# Patient Record
Sex: FEMALE | Race: OTHER | HISPANIC OR LATINO | URBAN - METROPOLITAN AREA
[De-identification: names, ages, dates, MRNs, and addresses within clinical notes are randomized per-mention and may not be internally consistent; named-entity substitution may affect disease eponyms.]

---

## 2024-06-25 ENCOUNTER — APPOINTMENT (OUTPATIENT)
Dept: CARDIOLOGY | Facility: HOSPITAL | Age: 59
End: 2024-06-25
Payer: MEDICARE

## 2024-06-25 ENCOUNTER — HOSPITAL ENCOUNTER (EMERGENCY)
Facility: HOSPITAL | Age: 59
Discharge: HOME | End: 2024-06-25
Attending: EMERGENCY MEDICINE
Payer: MEDICARE

## 2024-06-25 VITALS
DIASTOLIC BLOOD PRESSURE: 84 MMHG | HEART RATE: 68 BPM | SYSTOLIC BLOOD PRESSURE: 153 MMHG | RESPIRATION RATE: 20 BRPM | BODY MASS INDEX: 38.98 KG/M2 | TEMPERATURE: 97.9 F | OXYGEN SATURATION: 95 % | WEIGHT: 220 LBS | HEIGHT: 63 IN

## 2024-06-25 DIAGNOSIS — R73.9 HYPERGLYCEMIA: Primary | ICD-10-CM

## 2024-06-25 DIAGNOSIS — Z91.199 PATIENT'S NONCOMPLIANCE WITH OTHER MEDICAL TREATMENT AND REGIMEN DUE TO UNSPECIFIED REASON: ICD-10-CM

## 2024-06-25 LAB
ALBUMIN SERPL BCP-MCNC: 4.1 G/DL (ref 3.4–5)
ALP SERPL-CCNC: 78 U/L (ref 33–136)
ALT SERPL W P-5'-P-CCNC: 27 U/L (ref 7–45)
ANION GAP BLDV CALCULATED.4IONS-SCNC: 17 MMOL/L (ref 10–25)
ANION GAP SERPL CALC-SCNC: 15 MMOL/L (ref 10–20)
APPEARANCE UR: CLEAR
AST SERPL W P-5'-P-CCNC: 21 U/L (ref 9–39)
B-OH-BUTYR SERPL-SCNC: 0.15 MMOL/L (ref 0.02–0.27)
BASE EXCESS BLDV CALC-SCNC: 0.2 MMOL/L (ref -2–3)
BASOPHILS # BLD AUTO: 0.05 X10*3/UL (ref 0–0.1)
BASOPHILS NFR BLD AUTO: 0.5 %
BILIRUB SERPL-MCNC: 0.5 MG/DL (ref 0–1.2)
BILIRUB UR STRIP.AUTO-MCNC: NEGATIVE MG/DL
BODY TEMPERATURE: 37 DEGREES CELSIUS
BUN SERPL-MCNC: 12 MG/DL (ref 6–23)
CA-I BLDV-SCNC: 1.23 MMOL/L (ref 1.1–1.33)
CALCIUM SERPL-MCNC: 9.6 MG/DL (ref 8.6–10.3)
CARDIAC TROPONIN I PNL SERPL HS: 4 NG/L (ref 0–13)
CARDIAC TROPONIN I PNL SERPL HS: 4 NG/L (ref 0–13)
CHLORIDE BLDV-SCNC: 92 MMOL/L (ref 98–107)
CHLORIDE SERPL-SCNC: 94 MMOL/L (ref 98–107)
CO2 SERPL-SCNC: 26 MMOL/L (ref 21–32)
COLOR UR: COLORLESS
CREAT SERPL-MCNC: 0.9 MG/DL (ref 0.5–1.05)
CRITICAL CALL TIME: 1800
CRITICAL CALLED BY: ABNORMAL
CRITICAL CALLED TO: ABNORMAL
CRITICAL READ BACK: ABNORMAL
EGFRCR SERPLBLD CKD-EPI 2021: 73 ML/MIN/1.73M*2
EOSINOPHIL # BLD AUTO: 0.21 X10*3/UL (ref 0–0.7)
EOSINOPHIL NFR BLD AUTO: 2 %
ERYTHROCYTE [DISTWIDTH] IN BLOOD BY AUTOMATED COUNT: 12.2 % (ref 11.5–14.5)
GLUCOSE BLD MANUAL STRIP-MCNC: 363 MG/DL (ref 74–99)
GLUCOSE BLD MANUAL STRIP-MCNC: 489 MG/DL (ref 74–99)
GLUCOSE BLD MANUAL STRIP-MCNC: 558 MG/DL (ref 74–99)
GLUCOSE BLDV-MCNC: ABNORMAL MG/DL
GLUCOSE SERPL-MCNC: 637 MG/DL (ref 74–99)
GLUCOSE UR STRIP.AUTO-MCNC: ABNORMAL MG/DL
HCO3 BLDV-SCNC: 25.4 MMOL/L (ref 22–26)
HCT VFR BLD AUTO: 43.6 % (ref 36–46)
HCT VFR BLD EST: 45 % (ref 36–46)
HGB BLD-MCNC: 15 G/DL (ref 12–16)
HGB BLDV-MCNC: 15 G/DL (ref 12–16)
IMM GRANULOCYTES # BLD AUTO: 0.06 X10*3/UL (ref 0–0.7)
IMM GRANULOCYTES NFR BLD AUTO: 0.6 % (ref 0–0.9)
INHALED O2 CONCENTRATION: 21 %
KETONES UR STRIP.AUTO-MCNC: NEGATIVE MG/DL
LACTATE BLDV-SCNC: 2.7 MMOL/L (ref 0.4–2)
LACTATE SERPL-SCNC: 1.8 MMOL/L (ref 0.4–2)
LACTATE SERPL-SCNC: 2.7 MMOL/L (ref 0.4–2)
LEUKOCYTE ESTERASE UR QL STRIP.AUTO: NEGATIVE
LIPASE SERPL-CCNC: 51 U/L (ref 9–82)
LYMPHOCYTES # BLD AUTO: 4.02 X10*3/UL (ref 1.2–4.8)
LYMPHOCYTES NFR BLD AUTO: 38 %
MAGNESIUM SERPL-MCNC: 1.87 MG/DL (ref 1.6–2.4)
MCH RBC QN AUTO: 29.9 PG (ref 26–34)
MCHC RBC AUTO-ENTMCNC: 34.4 G/DL (ref 32–36)
MCV RBC AUTO: 87 FL (ref 80–100)
MONOCYTES # BLD AUTO: 0.8 X10*3/UL (ref 0.1–1)
MONOCYTES NFR BLD AUTO: 7.6 %
NEUTROPHILS # BLD AUTO: 5.45 X10*3/UL (ref 1.2–7.7)
NEUTROPHILS NFR BLD AUTO: 51.3 %
NITRITE UR QL STRIP.AUTO: NEGATIVE
NRBC BLD-RTO: 0 /100 WBCS (ref 0–0)
OXYHGB MFR BLDV: 70.8 % (ref 45–75)
PCO2 BLDV: 42 MM HG (ref 41–51)
PH BLDV: 7.39 PH (ref 7.33–7.43)
PH UR STRIP.AUTO: 6.5 [PH]
PLATELET # BLD AUTO: 157 X10*3/UL (ref 150–450)
PO2 BLDV: 42 MM HG (ref 35–45)
POTASSIUM BLDV-SCNC: 4.2 MMOL/L (ref 3.5–5.3)
POTASSIUM SERPL-SCNC: 4.2 MMOL/L (ref 3.5–5.3)
PROT SERPL-MCNC: 7.5 G/DL (ref 6.4–8.2)
PROT UR STRIP.AUTO-MCNC: NEGATIVE MG/DL
RBC # BLD AUTO: 5.01 X10*6/UL (ref 4–5.2)
RBC # UR STRIP.AUTO: NEGATIVE /UL
SAO2 % BLDV: 72 % (ref 45–75)
SODIUM BLDV-SCNC: 130 MMOL/L (ref 136–145)
SODIUM SERPL-SCNC: 131 MMOL/L (ref 136–145)
SP GR UR STRIP.AUTO: 1.02
TEST COMMENT: ABNORMAL
UROBILINOGEN UR STRIP.AUTO-MCNC: NORMAL MG/DL
WBC # BLD AUTO: 10.6 X10*3/UL (ref 4.4–11.3)

## 2024-06-25 PROCEDURE — 84484 ASSAY OF TROPONIN QUANT: CPT | Mod: 91 | Performed by: EMERGENCY MEDICINE

## 2024-06-25 PROCEDURE — 82947 ASSAY GLUCOSE BLOOD QUANT: CPT | Mod: 91

## 2024-06-25 PROCEDURE — 93005 ELECTROCARDIOGRAM TRACING: CPT

## 2024-06-25 PROCEDURE — 82435 ASSAY OF BLOOD CHLORIDE: CPT | Performed by: EMERGENCY MEDICINE

## 2024-06-25 PROCEDURE — 85025 COMPLETE CBC W/AUTO DIFF WBC: CPT | Performed by: EMERGENCY MEDICINE

## 2024-06-25 PROCEDURE — 82947 ASSAY GLUCOSE BLOOD QUANT: CPT

## 2024-06-25 PROCEDURE — 81003 URINALYSIS AUTO W/O SCOPE: CPT | Performed by: EMERGENCY MEDICINE

## 2024-06-25 PROCEDURE — 36415 COLL VENOUS BLD VENIPUNCTURE: CPT | Mod: 91 | Performed by: EMERGENCY MEDICINE

## 2024-06-25 PROCEDURE — 84484 ASSAY OF TROPONIN QUANT: CPT | Performed by: EMERGENCY MEDICINE

## 2024-06-25 PROCEDURE — 83605 ASSAY OF LACTIC ACID: CPT | Mod: 91 | Performed by: EMERGENCY MEDICINE

## 2024-06-25 PROCEDURE — 82810 BLOOD GASES O2 SAT ONLY: CPT | Mod: CCI | Performed by: EMERGENCY MEDICINE

## 2024-06-25 PROCEDURE — 83690 ASSAY OF LIPASE: CPT | Performed by: EMERGENCY MEDICINE

## 2024-06-25 PROCEDURE — 99284 EMERGENCY DEPT VISIT MOD MDM: CPT

## 2024-06-25 PROCEDURE — 2500000004 HC RX 250 GENERAL PHARMACY W/ HCPCS (ALT 636 FOR OP/ED): Performed by: EMERGENCY MEDICINE

## 2024-06-25 PROCEDURE — 82010 KETONE BODYS QUAN: CPT | Performed by: EMERGENCY MEDICINE

## 2024-06-25 PROCEDURE — 96374 THER/PROPH/DIAG INJ IV PUSH: CPT

## 2024-06-25 PROCEDURE — 83735 ASSAY OF MAGNESIUM: CPT | Performed by: EMERGENCY MEDICINE

## 2024-06-25 PROCEDURE — 84132 ASSAY OF SERUM POTASSIUM: CPT | Performed by: EMERGENCY MEDICINE

## 2024-06-25 PROCEDURE — 84132 ASSAY OF SERUM POTASSIUM: CPT | Mod: 91 | Performed by: EMERGENCY MEDICINE

## 2024-06-25 PROCEDURE — 2500000002 HC RX 250 W HCPCS SELF ADMINISTERED DRUGS (ALT 637 FOR MEDICARE OP, ALT 636 FOR OP/ED): Performed by: EMERGENCY MEDICINE

## 2024-06-25 PROCEDURE — 96361 HYDRATE IV INFUSION ADD-ON: CPT

## 2024-06-25 RX ORDER — INSULIN LISPRO 100 [IU]/ML
18 INJECTION, SOLUTION INTRAVENOUS; SUBCUTANEOUS ONCE
Status: COMPLETED | OUTPATIENT
Start: 2024-06-25 | End: 2024-06-25

## 2024-06-25 RX ORDER — ONDANSETRON HYDROCHLORIDE 2 MG/ML
4 INJECTION, SOLUTION INTRAVENOUS ONCE
Status: COMPLETED | OUTPATIENT
Start: 2024-06-25 | End: 2024-06-25

## 2024-06-25 ASSESSMENT — PAIN DESCRIPTION - DESCRIPTORS: DESCRIPTORS: DISCOMFORT;ACHING

## 2024-06-25 ASSESSMENT — PAIN - FUNCTIONAL ASSESSMENT: PAIN_FUNCTIONAL_ASSESSMENT: 0-10

## 2024-06-25 ASSESSMENT — LIFESTYLE VARIABLES
EVER FELT BAD OR GUILTY ABOUT YOUR DRINKING: NO
EVER HAD A DRINK FIRST THING IN THE MORNING TO STEADY YOUR NERVES TO GET RID OF A HANGOVER: NO
HAVE PEOPLE ANNOYED YOU BY CRITICIZING YOUR DRINKING: NO
TOTAL SCORE: 0
HAVE YOU EVER FELT YOU SHOULD CUT DOWN ON YOUR DRINKING: NO

## 2024-06-25 ASSESSMENT — PAIN DESCRIPTION - PAIN TYPE: TYPE: CHRONIC PAIN

## 2024-06-25 ASSESSMENT — PAIN SCALES - GENERAL: PAINLEVEL_OUTOF10: 7

## 2024-06-25 NOTE — ED PROVIDER NOTES
HPI   Chief Complaint   Patient presents with    Hyperglycemia     Pt BIBA from home due to high gluc readings x1 week. Pt states she has blurred vision, increase urinary frequency, midsternal chest discomfort, increased sob with exertion. Pt states she is a diabetic, unsure if she is type 1 or 2. Pt states 7/10 chronic pain- attends pain management       Patient is a 59-year-old female, medical history significant for insulin-dependent diabetes, hypertension, sleep apnea who presents to the emergency department feeling generally unwell.  She states that she has had difficulty with her home insulin regimen.  She has an arm monitor that would not stay on.  She states she has not been checking her sugar regularly.  Today, she took her blood sugar and it was almost 600.  She states she has had increased thirst, increased urination, some chest tightness, generalized malaise.  She denies fever.  She denies chills or sweats.  She has not had any vomiting but has felt nauseated.                          No data recorded                     Patient History   No past medical history on file.  No past surgical history on file.  No family history on file.  Social History     Tobacco Use    Smoking status: Not on file    Smokeless tobacco: Not on file   Substance Use Topics    Alcohol use: Not on file    Drug use: Not on file       Physical Exam   ED Triage Vitals   Temp Pulse Resp BP   -- -- -- --      SpO2 Temp src Heart Rate Source Patient Position   -- -- -- --      BP Location FiO2 (%)     -- --       Physical Exam  Vitals and nursing note reviewed.   Constitutional:       General: She is not in acute distress.     Appearance: She is well-developed.   HENT:      Head: Normocephalic and atraumatic.   Eyes:      Extraocular Movements: Extraocular movements intact.      Conjunctiva/sclera: Conjunctivae normal.      Pupils: Pupils are equal, round, and reactive to light.   Cardiovascular:      Rate and Rhythm: Normal rate and  regular rhythm.      Heart sounds: No murmur heard.  Pulmonary:      Effort: Pulmonary effort is normal. No respiratory distress.      Breath sounds: Normal breath sounds.   Abdominal:      Palpations: Abdomen is soft.      Tenderness: There is no abdominal tenderness.   Musculoskeletal:         General: No swelling.      Cervical back: Neck supple.   Skin:     General: Skin is warm and dry.      Capillary Refill: Capillary refill takes less than 2 seconds.   Neurological:      General: No focal deficit present.      Mental Status: She is alert and oriented to person, place, and time.   Psychiatric:         Mood and Affect: Mood normal.         ED Course & [facility]   ED Course as of 06/27/24 0635   Tue Jun 25, 2024   1801 CBC unremarkable. [MK]   1801 Blood gas shows no evidence of acidosis. [MK]   1829 Chemistry panel does demonstrate markedly elevated serum glucose of 637.  Normal bicarb.  No anion gap. [MK]   1829 Urine shows no serum ketones. [MK]   1850 Cardiac enzymes normal. [MK]   1945 Lactic acid normal.  Repeat troponin normal. [MK]      ED Course User Index  [MK] Barak Jensen MD         Diagnoses as of 06/27/24 0635   Hyperglycemia   Patient's noncompliance with other medical treatment and regimen due to unspecified reason       Medical Decision Making  Medical Decision Making: Patient presents emergency department with increasing thirst, urinary frequency, dizziness, generalized malaise.  She is an insulin-dependent diabetic and her meter has not been accurately working.  Today was the first time that she checked her sugar in a while.  She states has been compliant with her insulin, but does not been using her sliding scale because she has not been checking her sugar.  Broad metabolic workup was pursued.  Patient's gas does not show any acidosis.  Serum ketones were negative.  She is hyperglycemic.  Patient was given fluids and insulin.  Blood sugar is slowly trending down.  She does have improvement of  symptoms.  Plan will be to continue to hydrate the patient and bring sugar down.  I do feel as long as she is symptomatically improved, she can safely follow-up with her endocrinologist.    EKG interpreted by myself (ED attending physician): Sinus rhythm.  Rate of 83.  Normal axis and intervals.  No acute ischemia.  No STEMI.    Differential Diagnoses Considered: Hyperglycemia, HHS, DKA, UTI    Chronic Medical Conditions Significantly Affecting Care: Insulin-dependent diabetes    External Records Reviewed: I reviewed recent and relevant outside records including: Medication reconciliation    Independent Interpretation of Studies:  I independently interpreted: No image    Escalation of Care:  Appropriate for likely outpatient management if symptoms improved after blood sugar control    Social Determinants of Health Significantly Affecting Care:  No known social determinant    Prescription Drug Consideration:'s insulin therapy    Diagnostic testing considered: Noncontributory    Discussion of Management with Other Providers:   I discussed the patient/results with: [admitting team, consultant, radiologist, social work, EPAT, case management, PT/OT, RT, PCP, etc.]          Procedure  Procedures     Barak Jensen MD  06/27/24 3579

## 2024-06-26 LAB
ATRIAL RATE: 84 BPM
HOLD SPECIMEN: NORMAL
P AXIS: 67 DEGREES
PR INTERVAL: 164 MS
Q ONSET: 253 MS
QRS COUNT: 13 BEATS
QRS DURATION: 82 MS
QT INTERVAL: 365 MS
QTC CALCULATION(BAZETT): 429 MS
QTC FREDERICIA: 406 MS
R AXIS: 1 DEGREES
T AXIS: 39 DEGREES
T OFFSET: 436 MS
VENTRICULAR RATE: 83 BPM

## 2024-06-26 NOTE — PROGRESS NOTES
Patient was signed out to me with repeat glucose levels at 363.  Patient has not been taking insulin as directed because she has not been monitoring her glucose.  She does have a fingerstick glucometer at home and she will start using it tomorrow.  She took her 30 units of Lantus earlier this morning.  She received multiple doses of short acting insulin here as well as crystalloid infusion.  Patient wishes to be discharged.

## 2025-03-28 ENCOUNTER — APPOINTMENT (OUTPATIENT)
Dept: RADIOLOGY | Facility: HOSPITAL | Age: 60
End: 2025-03-28
Payer: MEDICARE

## 2025-03-28 ENCOUNTER — APPOINTMENT (OUTPATIENT)
Dept: CARDIOLOGY | Facility: HOSPITAL | Age: 60
End: 2025-03-28
Payer: MEDICARE

## 2025-03-28 ENCOUNTER — HOSPITAL ENCOUNTER (OUTPATIENT)
Facility: HOSPITAL | Age: 60
Setting detail: OBSERVATION
Discharge: HOME | End: 2025-03-30
Attending: STUDENT IN AN ORGANIZED HEALTH CARE EDUCATION/TRAINING PROGRAM | Admitting: STUDENT IN AN ORGANIZED HEALTH CARE EDUCATION/TRAINING PROGRAM
Payer: MEDICARE

## 2025-03-28 DIAGNOSIS — R06.09 EXERTIONAL DYSPNEA: Primary | ICD-10-CM

## 2025-03-28 DIAGNOSIS — R07.9 CHEST PAIN, UNSPECIFIED TYPE: ICD-10-CM

## 2025-03-28 LAB
ALBUMIN SERPL BCP-MCNC: 4 G/DL (ref 3.4–5)
ALP SERPL-CCNC: 52 U/L (ref 33–136)
ALT SERPL W P-5'-P-CCNC: 22 U/L (ref 7–45)
ANION GAP SERPL CALC-SCNC: 14 MMOL/L (ref 10–20)
AST SERPL W P-5'-P-CCNC: 18 U/L (ref 9–39)
BASOPHILS # BLD AUTO: 0.03 X10*3/UL (ref 0–0.1)
BASOPHILS NFR BLD AUTO: 0.3 %
BILIRUB SERPL-MCNC: 0.4 MG/DL (ref 0–1.2)
BNP SERPL-MCNC: 11 PG/ML (ref 0–99)
BUN SERPL-MCNC: 17 MG/DL (ref 6–23)
CALCIUM SERPL-MCNC: 9.3 MG/DL (ref 8.6–10.3)
CARDIAC TROPONIN I PNL SERPL HS: 3 NG/L (ref 0–13)
CARDIAC TROPONIN I PNL SERPL HS: <3 NG/L (ref 0–13)
CHLORIDE SERPL-SCNC: 103 MMOL/L (ref 98–107)
CHOLEST SERPL-MCNC: 140 MG/DL (ref 0–199)
CHOLESTEROL/HDL RATIO: 3.7
CO2 SERPL-SCNC: 25 MMOL/L (ref 21–32)
CREAT SERPL-MCNC: 0.89 MG/DL (ref 0.5–1.05)
D DIMER PPP FEU-MCNC: 244 NG/ML FEU
EGFRCR SERPLBLD CKD-EPI 2021: 74 ML/MIN/1.73M*2
EOSINOPHIL # BLD AUTO: 0.35 X10*3/UL (ref 0–0.7)
EOSINOPHIL NFR BLD AUTO: 3.5 %
ERYTHROCYTE [DISTWIDTH] IN BLOOD BY AUTOMATED COUNT: 13.3 % (ref 11.5–14.5)
GLUCOSE BLD MANUAL STRIP-MCNC: 172 MG/DL (ref 74–99)
GLUCOSE SERPL-MCNC: 178 MG/DL (ref 74–99)
HCT VFR BLD AUTO: 43.6 % (ref 36–46)
HDLC SERPL-MCNC: 37.8 MG/DL
HGB BLD-MCNC: 13.4 G/DL (ref 12–16)
IMM GRANULOCYTES # BLD AUTO: 0.04 X10*3/UL (ref 0–0.7)
IMM GRANULOCYTES NFR BLD AUTO: 0.4 % (ref 0–0.9)
LDLC SERPL CALC-MCNC: 61 MG/DL
LYMPHOCYTES # BLD AUTO: 4.1 X10*3/UL (ref 1.2–4.8)
LYMPHOCYTES NFR BLD AUTO: 41.4 %
MAGNESIUM SERPL-MCNC: 2.01 MG/DL (ref 1.6–2.4)
MCH RBC QN AUTO: 28 PG (ref 26–34)
MCHC RBC AUTO-ENTMCNC: 30.7 G/DL (ref 32–36)
MCV RBC AUTO: 91 FL (ref 80–100)
MONOCYTES # BLD AUTO: 0.62 X10*3/UL (ref 0.1–1)
MONOCYTES NFR BLD AUTO: 6.3 %
NEUTROPHILS # BLD AUTO: 4.76 X10*3/UL (ref 1.2–7.7)
NEUTROPHILS NFR BLD AUTO: 48.1 %
NON HDL CHOLESTEROL: 102 MG/DL (ref 0–149)
NRBC BLD-RTO: 0 /100 WBCS (ref 0–0)
PLATELET # BLD AUTO: 178 X10*3/UL (ref 150–450)
POTASSIUM SERPL-SCNC: 3.9 MMOL/L (ref 3.5–5.3)
PROT SERPL-MCNC: 7.2 G/DL (ref 6.4–8.2)
RBC # BLD AUTO: 4.79 X10*6/UL (ref 4–5.2)
SODIUM SERPL-SCNC: 138 MMOL/L (ref 136–145)
TRIGL SERPL-MCNC: 206 MG/DL (ref 0–149)
VLDL: 41 MG/DL (ref 0–40)
WBC # BLD AUTO: 9.9 X10*3/UL (ref 4.4–11.3)

## 2025-03-28 PROCEDURE — 80053 COMPREHEN METABOLIC PANEL: CPT

## 2025-03-28 PROCEDURE — 2500000001 HC RX 250 WO HCPCS SELF ADMINISTERED DRUGS (ALT 637 FOR MEDICARE OP): Performed by: STUDENT IN AN ORGANIZED HEALTH CARE EDUCATION/TRAINING PROGRAM

## 2025-03-28 PROCEDURE — 96360 HYDRATION IV INFUSION INIT: CPT

## 2025-03-28 PROCEDURE — 99223 1ST HOSP IP/OBS HIGH 75: CPT | Performed by: STUDENT IN AN ORGANIZED HEALTH CARE EDUCATION/TRAINING PROGRAM

## 2025-03-28 PROCEDURE — G0378 HOSPITAL OBSERVATION PER HR: HCPCS

## 2025-03-28 PROCEDURE — 99285 EMERGENCY DEPT VISIT HI MDM: CPT | Mod: 25 | Performed by: STUDENT IN AN ORGANIZED HEALTH CARE EDUCATION/TRAINING PROGRAM

## 2025-03-28 PROCEDURE — 85025 COMPLETE CBC W/AUTO DIFF WBC: CPT

## 2025-03-28 PROCEDURE — 84484 ASSAY OF TROPONIN QUANT: CPT

## 2025-03-28 PROCEDURE — 83880 ASSAY OF NATRIURETIC PEPTIDE: CPT

## 2025-03-28 PROCEDURE — 71046 X-RAY EXAM CHEST 2 VIEWS: CPT

## 2025-03-28 PROCEDURE — 36415 COLL VENOUS BLD VENIPUNCTURE: CPT

## 2025-03-28 PROCEDURE — 80061 LIPID PANEL: CPT | Performed by: STUDENT IN AN ORGANIZED HEALTH CARE EDUCATION/TRAINING PROGRAM

## 2025-03-28 PROCEDURE — 93005 ELECTROCARDIOGRAM TRACING: CPT

## 2025-03-28 PROCEDURE — 71046 X-RAY EXAM CHEST 2 VIEWS: CPT | Performed by: STUDENT IN AN ORGANIZED HEALTH CARE EDUCATION/TRAINING PROGRAM

## 2025-03-28 PROCEDURE — 83036 HEMOGLOBIN GLYCOSYLATED A1C: CPT | Mod: PARLAB | Performed by: STUDENT IN AN ORGANIZED HEALTH CARE EDUCATION/TRAINING PROGRAM

## 2025-03-28 PROCEDURE — 85379 FIBRIN DEGRADATION QUANT: CPT

## 2025-03-28 PROCEDURE — 82947 ASSAY GLUCOSE BLOOD QUANT: CPT | Mod: 59

## 2025-03-28 PROCEDURE — 83735 ASSAY OF MAGNESIUM: CPT

## 2025-03-28 RX ORDER — OXYCODONE HYDROCHLORIDE 5 MG/1
2.5 TABLET ORAL EVERY 6 HOURS PRN
Status: DISCONTINUED | OUTPATIENT
Start: 2025-03-28 | End: 2025-03-30 | Stop reason: HOSPADM

## 2025-03-28 RX ORDER — OXYCODONE AND ACETAMINOPHEN 7.5; 325 MG/1; MG/1
1 TABLET ORAL EVERY 6 HOURS
COMMUNITY
Start: 2025-03-17

## 2025-03-28 RX ORDER — ENOXAPARIN SODIUM 100 MG/ML
40 INJECTION SUBCUTANEOUS EVERY 24 HOURS
Status: DISCONTINUED | OUTPATIENT
Start: 2025-03-28 | End: 2025-03-30 | Stop reason: HOSPADM

## 2025-03-28 RX ORDER — OXYCODONE AND ACETAMINOPHEN 5; 325 MG/1; MG/1
1 TABLET ORAL EVERY 6 HOURS PRN
Status: DISCONTINUED | OUTPATIENT
Start: 2025-03-28 | End: 2025-03-30 | Stop reason: HOSPADM

## 2025-03-28 RX ORDER — NAPROXEN SODIUM 220 MG/1
81 TABLET, FILM COATED ORAL DAILY
Status: DISCONTINUED | OUTPATIENT
Start: 2025-03-29 | End: 2025-03-30 | Stop reason: HOSPADM

## 2025-03-28 RX ORDER — CYCLOBENZAPRINE HCL 10 MG
10 TABLET ORAL DAILY PRN
Status: DISCONTINUED | OUTPATIENT
Start: 2025-03-28 | End: 2025-03-30 | Stop reason: HOSPADM

## 2025-03-28 RX ORDER — DEXTROSE 50 % IN WATER (D50W) INTRAVENOUS SYRINGE
12.5
Status: DISCONTINUED | OUTPATIENT
Start: 2025-03-28 | End: 2025-03-30 | Stop reason: HOSPADM

## 2025-03-28 RX ORDER — CYCLOBENZAPRINE HCL 10 MG
10 TABLET ORAL CONTINUOUS PRN
COMMUNITY

## 2025-03-28 RX ORDER — ASPIRIN 81 MG/1
81 TABLET ORAL DAILY
COMMUNITY

## 2025-03-28 RX ORDER — NITROGLYCERIN 0.4 MG/1
0.4 TABLET SUBLINGUAL EVERY 5 MIN PRN
Status: DISCONTINUED | OUTPATIENT
Start: 2025-03-28 | End: 2025-03-30 | Stop reason: HOSPADM

## 2025-03-28 RX ORDER — METFORMIN HYDROCHLORIDE 500 MG/1
500 TABLET ORAL 2 TIMES DAILY
COMMUNITY

## 2025-03-28 RX ORDER — EMPAGLIFLOZIN 10 MG/1
10 TABLET, FILM COATED ORAL
COMMUNITY

## 2025-03-28 RX ORDER — INSULIN ASPART 100 [IU]/ML
INJECTION, SOLUTION INTRAVENOUS; SUBCUTANEOUS
COMMUNITY
Start: 2024-02-24

## 2025-03-28 RX ORDER — ACETAMINOPHEN 325 MG/1
975 TABLET ORAL ONCE
Status: DISCONTINUED | OUTPATIENT
Start: 2025-03-29 | End: 2025-03-29

## 2025-03-28 RX ORDER — INSULIN GLARGINE 100 [IU]/ML
30 INJECTION, SOLUTION SUBCUTANEOUS EVERY MORNING
COMMUNITY

## 2025-03-28 RX ORDER — INSULIN GLARGINE 100 [IU]/ML
15 INJECTION, SOLUTION SUBCUTANEOUS EVERY MORNING
Status: DISCONTINUED | OUTPATIENT
Start: 2025-03-29 | End: 2025-03-29

## 2025-03-28 RX ORDER — GLIMEPIRIDE 4 MG/1
4 TABLET ORAL
COMMUNITY
Start: 2025-01-29

## 2025-03-28 RX ORDER — INSULIN LISPRO 100 [IU]/ML
0-10 INJECTION, SOLUTION INTRAVENOUS; SUBCUTANEOUS
Status: DISCONTINUED | OUTPATIENT
Start: 2025-03-28 | End: 2025-03-29

## 2025-03-28 RX ORDER — DEXTROSE 50 % IN WATER (D50W) INTRAVENOUS SYRINGE
25
Status: DISCONTINUED | OUTPATIENT
Start: 2025-03-28 | End: 2025-03-30 | Stop reason: HOSPADM

## 2025-03-28 RX ORDER — OXYCODONE AND ACETAMINOPHEN 7.5; 325 MG/1; MG/1
1 TABLET ORAL EVERY 6 HOURS PRN
Status: DISCONTINUED | OUTPATIENT
Start: 2025-03-28 | End: 2025-03-28

## 2025-03-28 RX ADMIN — SODIUM CHLORIDE, POTASSIUM CHLORIDE, SODIUM LACTATE AND CALCIUM CHLORIDE 500 ML: 600; 310; 30; 20 INJECTION, SOLUTION INTRAVENOUS at 23:41

## 2025-03-28 RX ADMIN — NITROGLYCERIN 0.4 MG: 0.4 TABLET SUBLINGUAL at 23:10

## 2025-03-28 RX ADMIN — OXYCODONE HYDROCHLORIDE 2.5 MG: 5 TABLET ORAL at 23:36

## 2025-03-28 RX ADMIN — OXYCODONE HYDROCHLORIDE AND ACETAMINOPHEN 1 TABLET: 5; 325 TABLET ORAL at 23:36

## 2025-03-28 ASSESSMENT — LIFESTYLE VARIABLES
TOTAL SCORE: 0
EVER HAD A DRINK FIRST THING IN THE MORNING TO STEADY YOUR NERVES TO GET RID OF A HANGOVER: NO
HAVE PEOPLE ANNOYED YOU BY CRITICIZING YOUR DRINKING: NO
EVER FELT BAD OR GUILTY ABOUT YOUR DRINKING: NO
HAVE YOU EVER FELT YOU SHOULD CUT DOWN ON YOUR DRINKING: NO

## 2025-03-28 ASSESSMENT — PAIN SCALES - GENERAL
PAINLEVEL_OUTOF10: 9
PAINLEVEL_OUTOF10: 9

## 2025-03-28 ASSESSMENT — COLUMBIA-SUICIDE SEVERITY RATING SCALE - C-SSRS
1. IN THE PAST MONTH, HAVE YOU WISHED YOU WERE DEAD OR WISHED YOU COULD GO TO SLEEP AND NOT WAKE UP?: NO
6. HAVE YOU EVER DONE ANYTHING, STARTED TO DO ANYTHING, OR PREPARED TO DO ANYTHING TO END YOUR LIFE?: NO
2. HAVE YOU ACTUALLY HAD ANY THOUGHTS OF KILLING YOURSELF?: NO

## 2025-03-28 ASSESSMENT — PAIN DESCRIPTION - LOCATION
LOCATION: CHEST
LOCATION: BACK

## 2025-03-28 ASSESSMENT — PAIN DESCRIPTION - ORIENTATION: ORIENTATION: MID

## 2025-03-28 ASSESSMENT — PAIN DESCRIPTION - PAIN TYPE: TYPE: ACUTE PAIN

## 2025-03-28 ASSESSMENT — PAIN DESCRIPTION - DESCRIPTORS
DESCRIPTORS: PRESSURE
DESCRIPTORS: ACHING

## 2025-03-28 ASSESSMENT — PAIN - FUNCTIONAL ASSESSMENT
PAIN_FUNCTIONAL_ASSESSMENT: 0-10
PAIN_FUNCTIONAL_ASSESSMENT: 0-10

## 2025-03-28 NOTE — ED TRIAGE NOTES
Pt BIBA from home c/o SOB and CP x2 months, pt states it has gotten progressively worse the past 2 days. Pt states cp 9/10 with deep breath, denies radiation. Hx asthma, pt states she has been doing treatments and using her inhaler. Pt states SOB worse with exertion and when walking up stairs. 324 aspirin given in route. 100% RA

## 2025-03-29 LAB
CARDIAC TROPONIN I PNL SERPL HS: <3 NG/L (ref 0–13)
EST. AVERAGE GLUCOSE BLD GHB EST-MCNC: 151 MG/DL
GLUCOSE BLD MANUAL STRIP-MCNC: 126 MG/DL (ref 74–99)
GLUCOSE BLD MANUAL STRIP-MCNC: 145 MG/DL (ref 74–99)
GLUCOSE BLD MANUAL STRIP-MCNC: 150 MG/DL (ref 74–99)
GLUCOSE BLD MANUAL STRIP-MCNC: 161 MG/DL (ref 74–99)
HBA1C MFR BLD: 6.9 %
HOLD SPECIMEN: NORMAL

## 2025-03-29 PROCEDURE — 94640 AIRWAY INHALATION TREATMENT: CPT

## 2025-03-29 PROCEDURE — 2500000001 HC RX 250 WO HCPCS SELF ADMINISTERED DRUGS (ALT 637 FOR MEDICARE OP): Performed by: STUDENT IN AN ORGANIZED HEALTH CARE EDUCATION/TRAINING PROGRAM

## 2025-03-29 PROCEDURE — 99232 SBSQ HOSP IP/OBS MODERATE 35: CPT

## 2025-03-29 PROCEDURE — 82947 ASSAY GLUCOSE BLOOD QUANT: CPT

## 2025-03-29 PROCEDURE — 2500000002 HC RX 250 W HCPCS SELF ADMINISTERED DRUGS (ALT 637 FOR MEDICARE OP, ALT 636 FOR OP/ED): Performed by: STUDENT IN AN ORGANIZED HEALTH CARE EDUCATION/TRAINING PROGRAM

## 2025-03-29 PROCEDURE — 36415 COLL VENOUS BLD VENIPUNCTURE: CPT | Performed by: STUDENT IN AN ORGANIZED HEALTH CARE EDUCATION/TRAINING PROGRAM

## 2025-03-29 PROCEDURE — 2500000004 HC RX 250 GENERAL PHARMACY W/ HCPCS (ALT 636 FOR OP/ED): Performed by: STUDENT IN AN ORGANIZED HEALTH CARE EDUCATION/TRAINING PROGRAM

## 2025-03-29 PROCEDURE — 84484 ASSAY OF TROPONIN QUANT: CPT | Performed by: STUDENT IN AN ORGANIZED HEALTH CARE EDUCATION/TRAINING PROGRAM

## 2025-03-29 PROCEDURE — 2500000002 HC RX 250 W HCPCS SELF ADMINISTERED DRUGS (ALT 637 FOR MEDICARE OP, ALT 636 FOR OP/ED)

## 2025-03-29 PROCEDURE — G0378 HOSPITAL OBSERVATION PER HR: HCPCS

## 2025-03-29 PROCEDURE — 2500000005 HC RX 250 GENERAL PHARMACY W/O HCPCS: Performed by: STUDENT IN AN ORGANIZED HEALTH CARE EDUCATION/TRAINING PROGRAM

## 2025-03-29 RX ORDER — INSULIN LISPRO 100 [IU]/ML
0-10 INJECTION, SOLUTION INTRAVENOUS; SUBCUTANEOUS
Status: DISCONTINUED | OUTPATIENT
Start: 2025-03-29 | End: 2025-03-30 | Stop reason: HOSPADM

## 2025-03-29 RX ORDER — IPRATROPIUM BROMIDE AND ALBUTEROL SULFATE 2.5; .5 MG/3ML; MG/3ML
3 SOLUTION RESPIRATORY (INHALATION) EVERY 2 HOUR PRN
Status: DISCONTINUED | OUTPATIENT
Start: 2025-03-29 | End: 2025-03-30 | Stop reason: HOSPADM

## 2025-03-29 RX ORDER — INSULIN GLARGINE 100 [IU]/ML
15 INJECTION, SOLUTION SUBCUTANEOUS
Status: DISCONTINUED | OUTPATIENT
Start: 2025-03-30 | End: 2025-03-30 | Stop reason: HOSPADM

## 2025-03-29 RX ORDER — FLUTICASONE FUROATE AND VILANTEROL 100; 25 UG/1; UG/1
1 POWDER RESPIRATORY (INHALATION)
Status: DISCONTINUED | OUTPATIENT
Start: 2025-03-29 | End: 2025-03-29

## 2025-03-29 RX ORDER — ACETAMINOPHEN 325 MG/1
650 TABLET ORAL EVERY 6 HOURS PRN
Status: DISCONTINUED | OUTPATIENT
Start: 2025-03-29 | End: 2025-03-30 | Stop reason: HOSPADM

## 2025-03-29 RX ADMIN — Medication 0.5 L/MIN: at 08:52

## 2025-03-29 RX ADMIN — INSULIN GLARGINE 15 UNITS: 100 INJECTION, SOLUTION SUBCUTANEOUS at 08:45

## 2025-03-29 RX ADMIN — OXYCODONE HYDROCHLORIDE AND ACETAMINOPHEN 1 TABLET: 5; 325 TABLET ORAL at 14:55

## 2025-03-29 RX ADMIN — OXYCODONE HYDROCHLORIDE AND ACETAMINOPHEN 1 TABLET: 5; 325 TABLET ORAL at 21:04

## 2025-03-29 RX ADMIN — OXYCODONE HYDROCHLORIDE 2.5 MG: 5 TABLET ORAL at 21:00

## 2025-03-29 RX ADMIN — OXYCODONE HYDROCHLORIDE AND ACETAMINOPHEN 1 TABLET: 5; 325 TABLET ORAL at 08:44

## 2025-03-29 RX ADMIN — IPRATROPIUM BROMIDE AND ALBUTEROL SULFATE 3 ML: .5; 3 SOLUTION RESPIRATORY (INHALATION) at 14:49

## 2025-03-29 RX ADMIN — INSULIN LISPRO 2 UNITS: 100 INJECTION, SOLUTION INTRAVENOUS; SUBCUTANEOUS at 20:59

## 2025-03-29 RX ADMIN — EMPAGLIFLOZIN 10 MG: 10 TABLET, FILM COATED ORAL at 17:27

## 2025-03-29 RX ADMIN — ASPIRIN 81 MG CHEWABLE TABLET 81 MG: 81 TABLET CHEWABLE at 08:44

## 2025-03-29 SDOH — SOCIAL STABILITY: SOCIAL INSECURITY: ARE THERE ANY APPARENT SIGNS OF INJURIES/BEHAVIORS THAT COULD BE RELATED TO ABUSE/NEGLECT?: NO

## 2025-03-29 SDOH — SOCIAL STABILITY: SOCIAL INSECURITY: DO YOU FEEL UNSAFE GOING BACK TO THE PLACE WHERE YOU ARE LIVING?: NO

## 2025-03-29 SDOH — ECONOMIC STABILITY: FOOD INSECURITY: WITHIN THE PAST 12 MONTHS, YOU WORRIED THAT YOUR FOOD WOULD RUN OUT BEFORE YOU GOT THE MONEY TO BUY MORE.: NEVER TRUE

## 2025-03-29 SDOH — SOCIAL STABILITY: SOCIAL INSECURITY
WITHIN THE LAST YEAR, HAVE YOU BEEN RAPED OR FORCED TO HAVE ANY KIND OF SEXUAL ACTIVITY BY YOUR PARTNER OR EX-PARTNER?: NO

## 2025-03-29 SDOH — SOCIAL STABILITY: SOCIAL INSECURITY: DO YOU FEEL ANYONE HAS EXPLOITED OR TAKEN ADVANTAGE OF YOU FINANCIALLY OR OF YOUR PERSONAL PROPERTY?: NO

## 2025-03-29 SDOH — ECONOMIC STABILITY: HOUSING INSECURITY: IN THE LAST 12 MONTHS, WAS THERE A TIME WHEN YOU WERE NOT ABLE TO PAY THE MORTGAGE OR RENT ON TIME?: NO

## 2025-03-29 SDOH — SOCIAL STABILITY: SOCIAL INSECURITY: WITHIN THE LAST YEAR, HAVE YOU BEEN AFRAID OF YOUR PARTNER OR EX-PARTNER?: NO

## 2025-03-29 SDOH — ECONOMIC STABILITY: FOOD INSECURITY: WITHIN THE PAST 12 MONTHS, THE FOOD YOU BOUGHT JUST DIDN'T LAST AND YOU DIDN'T HAVE MONEY TO GET MORE.: NEVER TRUE

## 2025-03-29 SDOH — ECONOMIC STABILITY: INCOME INSECURITY: IN THE PAST 12 MONTHS HAS THE ELECTRIC, GAS, OIL, OR WATER COMPANY THREATENED TO SHUT OFF SERVICES IN YOUR HOME?: NO

## 2025-03-29 SDOH — SOCIAL STABILITY: SOCIAL INSECURITY: HAVE YOU HAD ANY THOUGHTS OF HARMING ANYONE ELSE?: NO

## 2025-03-29 SDOH — SOCIAL STABILITY: SOCIAL INSECURITY
WITHIN THE LAST YEAR, HAVE YOU BEEN KICKED, HIT, SLAPPED, OR OTHERWISE PHYSICALLY HURT BY YOUR PARTNER OR EX-PARTNER?: NO

## 2025-03-29 SDOH — SOCIAL STABILITY: SOCIAL INSECURITY: WITHIN THE LAST YEAR, HAVE YOU BEEN HUMILIATED OR EMOTIONALLY ABUSED IN OTHER WAYS BY YOUR PARTNER OR EX-PARTNER?: NO

## 2025-03-29 SDOH — ECONOMIC STABILITY: TRANSPORTATION INSECURITY: IN THE PAST 12 MONTHS, HAS LACK OF TRANSPORTATION KEPT YOU FROM MEDICAL APPOINTMENTS OR FROM GETTING MEDICATIONS?: NO

## 2025-03-29 SDOH — SOCIAL STABILITY: SOCIAL INSECURITY: ARE YOU OR HAVE YOU BEEN THREATENED OR ABUSED PHYSICALLY, EMOTIONALLY, OR SEXUALLY BY ANYONE?: NO

## 2025-03-29 SDOH — SOCIAL STABILITY: SOCIAL INSECURITY: ABUSE: ADULT

## 2025-03-29 SDOH — SOCIAL STABILITY: SOCIAL INSECURITY: DOES ANYONE TRY TO KEEP YOU FROM HAVING/CONTACTING OTHER FRIENDS OR DOING THINGS OUTSIDE YOUR HOME?: NO

## 2025-03-29 SDOH — ECONOMIC STABILITY: HOUSING INSECURITY: IN THE PAST 12 MONTHS, HOW MANY TIMES HAVE YOU MOVED WHERE YOU WERE LIVING?: 0

## 2025-03-29 SDOH — SOCIAL STABILITY: SOCIAL INSECURITY: HAVE YOU HAD THOUGHTS OF HARMING ANYONE ELSE?: NO

## 2025-03-29 SDOH — ECONOMIC STABILITY: FOOD INSECURITY: HOW HARD IS IT FOR YOU TO PAY FOR THE VERY BASICS LIKE FOOD, HOUSING, MEDICAL CARE, AND HEATING?: NOT HARD AT ALL

## 2025-03-29 SDOH — SOCIAL STABILITY: SOCIAL INSECURITY: HAS ANYONE EVER THREATENED TO HURT YOUR FAMILY OR YOUR PETS?: NO

## 2025-03-29 ASSESSMENT — PATIENT HEALTH QUESTIONNAIRE - PHQ9
2. FEELING DOWN, DEPRESSED OR HOPELESS: NOT AT ALL
1. LITTLE INTEREST OR PLEASURE IN DOING THINGS: NOT AT ALL
SUM OF ALL RESPONSES TO PHQ9 QUESTIONS 1 & 2: 0

## 2025-03-29 ASSESSMENT — ACTIVITIES OF DAILY LIVING (ADL)
PATIENT'S MEMORY ADEQUATE TO SAFELY COMPLETE DAILY ACTIVITIES?: YES
HEARING - LEFT EAR: FUNCTIONAL
HEARING - RIGHT EAR: FUNCTIONAL
DRESSING YOURSELF: INDEPENDENT
TOILETING: INDEPENDENT
FEEDING YOURSELF: INDEPENDENT
GROOMING: INDEPENDENT
TOILETING: INDEPENDENT
JUDGMENT_ADEQUATE_SAFELY_COMPLETE_DAILY_ACTIVITIES: YES
BATHING: INDEPENDENT
LACK_OF_TRANSPORTATION: NO
ADEQUATE_TO_COMPLETE_ADL: YES
FEEDING YOURSELF: INDEPENDENT
WALKS IN HOME: INDEPENDENT
BATHING: INDEPENDENT
PATIENT'S MEMORY ADEQUATE TO SAFELY COMPLETE DAILY ACTIVITIES?: YES
ADEQUATE_TO_COMPLETE_ADL: YES
GROOMING: INDEPENDENT
LACK_OF_TRANSPORTATION: NO
DRESSING YOURSELF: INDEPENDENT
HEARING - RIGHT EAR: FUNCTIONAL
HEARING - LEFT EAR: FUNCTIONAL
WALKS IN HOME: INDEPENDENT
JUDGMENT_ADEQUATE_SAFELY_COMPLETE_DAILY_ACTIVITIES: YES

## 2025-03-29 ASSESSMENT — LIFESTYLE VARIABLES
AUDIT-C TOTAL SCORE: 0
SKIP TO QUESTIONS 9-10: 1
HOW OFTEN DO YOU HAVE 6 OR MORE DRINKS ON ONE OCCASION: NEVER
SUBSTANCE_ABUSE_PAST_12_MONTHS: NO
AUDIT-C TOTAL SCORE: 0
PRESCIPTION_ABUSE_PAST_12_MONTHS: NO
HOW OFTEN DO YOU HAVE A DRINK CONTAINING ALCOHOL: NEVER
HOW MANY STANDARD DRINKS CONTAINING ALCOHOL DO YOU HAVE ON A TYPICAL DAY: PATIENT DOES NOT DRINK

## 2025-03-29 ASSESSMENT — COGNITIVE AND FUNCTIONAL STATUS - GENERAL
MOBILITY SCORE: 24
DAILY ACTIVITIY SCORE: 24
PATIENT BASELINE BEDBOUND: NO
MOBILITY SCORE: 24
DAILY ACTIVITIY SCORE: 24

## 2025-03-29 ASSESSMENT — COLUMBIA-SUICIDE SEVERITY RATING SCALE - C-SSRS
6. HAVE YOU EVER DONE ANYTHING, STARTED TO DO ANYTHING, OR PREPARED TO DO ANYTHING TO END YOUR LIFE?: NO
2. HAVE YOU ACTUALLY HAD ANY THOUGHTS OF KILLING YOURSELF?: NO
1. IN THE PAST MONTH, HAVE YOU WISHED YOU WERE DEAD OR WISHED YOU COULD GO TO SLEEP AND NOT WAKE UP?: NO

## 2025-03-29 ASSESSMENT — PAIN SCALES - GENERAL
PAINLEVEL_OUTOF10: 8
PAINLEVEL_OUTOF10: 8

## 2025-03-29 ASSESSMENT — PAIN - FUNCTIONAL ASSESSMENT: PAIN_FUNCTIONAL_ASSESSMENT: 0-10

## 2025-03-29 NOTE — CARE PLAN
The patient's goals for the shift include sleep    The clinical goals for the shift include Comfort and rest    Over the shift, the patient did not make progress toward the following goals. Barriers to progression include chronic illness. Recommendations to address these barriers include pain management and provide a quiet environment.    Problem: Pain - Adult  Goal: Verbalizes/displays adequate comfort level or baseline comfort level  Outcome: Not Progressing     Problem: Safety - Adult  Goal: Free from fall injury  Outcome: Not Progressing     Problem: Discharge Planning  Goal: Discharge to home or other facility with appropriate resources  Outcome: Not Progressing     Problem: Chronic Conditions and Co-morbidities  Goal: Patient's chronic conditions and co-morbidity symptoms are monitored and maintained or improved  Outcome: Not Progressing     Problem: Nutrition  Goal: Nutrient intake appropriate for maintaining nutritional needs  Outcome: Not Progressing

## 2025-03-29 NOTE — ED PROVIDER NOTES
HPI   Chief Complaint   Patient presents with    Shortness of Breath    Chest Pain       59-year-old female presenting to the emergency department with family due to shortness of breath.  Patient reports increasing dyspnea with exertion worsening over the past 2 days.  Patient additionally endorses chest pain with exertion that does not radiate.  She has had difficulty with ADLs such as walk up the stairs at her home secondary to her increasing shortness of breath and occasional lightheadedness.  Reports a history of asthma and has been using her inhalers and nebulizing treatments at home more frequently recently with minimal relief. Endorses mild swelling in her feet bilaterally. Denies fevers, chills, vision changes, tinnitus, cough/cold symptoms, abdominal pain, nausea, vomiting, constipation, diarrhea, urinary symptoms, weakness, numbness/tingling.  Patient states she has never seen a cardiologist and only takes 81 mg ASA daily.               Patient History   No past medical history on file.  No past surgical history on file.  No family history on file.  Social History     Tobacco Use    Smoking status: Not on file    Smokeless tobacco: Not on file   Substance Use Topics    Alcohol use: Not on file    Drug use: Not on file       Physical Exam   ED Triage Vitals [03/28/25 1916]   Temperature Heart Rate Respirations BP   36.8 °C (98.2 °F) 67 20 (!) 123/99      Pulse Ox Temp src Heart Rate Source Patient Position   100 % -- Monitor --      BP Location FiO2 (%)     -- --       Physical Exam  Constitutional:       General: She is not in acute distress.     Appearance: She is obese. She is not ill-appearing or toxic-appearing.   HENT:      Head: Atraumatic.      Nose: Nose normal.      Mouth/Throat:      Mouth: Mucous membranes are moist.      Pharynx: Oropharynx is clear.   Eyes:      Extraocular Movements: Extraocular movements intact.      Pupils: Pupils are equal, round, and reactive to light.   Cardiovascular:       Rate and Rhythm: Normal rate and regular rhythm.   Pulmonary:      Effort: Pulmonary effort is normal. No accessory muscle usage.      Breath sounds: Normal breath sounds. No wheezing, rhonchi or rales.   Abdominal:      General: Bowel sounds are normal.      Palpations: Abdomen is soft.      Tenderness: There is no abdominal tenderness. There is no right CVA tenderness or left CVA tenderness.   Musculoskeletal:         General: Normal range of motion.      Cervical back: Normal range of motion and neck supple.      Comments: Mild bilateral swelling of the feet. No unilateral swelling, erythema, or warmth. No calf tenderness. DP and PT 2+.    Skin:     General: Skin is warm and dry.      Capillary Refill: Capillary refill takes less than 2 seconds.   Neurological:      Mental Status: She is alert and oriented to person, place, and time.   Psychiatric:         Mood and Affect: Mood normal.           ED Course & MDM   Diagnoses as of 03/28/25 2056   Exertional dyspnea   Chest pain, unspecified type                 No data recorded     Alok Coma Scale Score: 15 (03/28/25 1918 : Sommer Garcia RN)                           Medical Decision Making  59-year-old female presenting to the emergency department with family due to exertional shortness of breath and chest pain. On arrival, patient is slightly hypertensive at 123/99. 324 ASA was given by EMS in route.  Patient denies any chest pain at this time. Otherwise, hemodynamically stable.  Patient is overall well-appearing and not in any acute distress.  She is nontoxic in appearance.  Lungs are clear to auscultation bilaterally.  Respirations are even and unlabored.  Abdomen is soft, nontender, with equal bowel sounds throughout.  No bilateral pitting leg edema.  There is mild edema present in the feet bilaterally.  EKG showing normal sinus rhythm with low voltage QRS, rate 63, , QRS 82, QTc 407.  No acute injury pattern or prior EKGs to compare.   High-sensitivity troponins negative making ACS less likely. Moderate heart score 4, will recommend admission for telemetry monitoring and cardiology consult. BNP 11 ruling out CHF. CMP remarkable for glucose of 178, patient has T1DM on insulin therapy at home.  CBC without leukocytosis or anemia.  D-dimer negative ruling out DVT/PE.  No profound indication of dehydration, infectious process, electrolyte, or metabolic derangement on lab work.  CXR negative for an acute cardiopulmonary process.  Patient was informed of all lab and imaging findings, all questions and concerns were answered. Patient was agreeable to plan of care and admission. Patient was accepted for admission to the hospitalist, Dr. Tejeda.        Procedure  Procedures     Hope Robledo PA-C  03/28/25 7721

## 2025-03-29 NOTE — H&P
History Of Present Illness  60 yo F with PMHx DM II with peripheral neuropathy, HTN, HLD, asthma, chronic back pain, depression, RICKY, TAYLOR on CPAP, and obesity presents with progressively worsening non radiating, midsternal chest pressure accompanied with sob. Over the past two months these symptoms have only been present with exertion and then resolve at rest. She can no longer go up the stairs without becoming very sob. She saw her PCP who referred to her a cardiologist but her appointment is not for a couple more weeks. This evening she began to have symptoms at rest which her prompted her to come to the ED for further evaluation. She denies cough, wheezing, orthopnea, LE swelling, palpitations, and diaphoresis. She took several breathing treatments which did not help. EMS gave her an aspirin loading dose and she states shortly after receiving this her pain and sob began to resolve. At time of exam she was endorsing 3/10 pain that resolved with one SL nitroglycerin. She has never had a cardiac workup in the past. She states her mother  of a fatal MI in her 70s and her son  of a MI at 35.     Surgical History  , cholecystectomy     Social History  Former smoker, denies EtOH, and illicit    Family History  CAD, mother with fatal MI in 70s, son with fatal MI at 35, HTN, HLD, DM II     Allergies  Patient has no known allergies.    Review of Systems   Cardiovascular:  Positive for chest pain.        RODRIGUEZ   All other systems reviewed and are negative.    Physical Exam  G: aox3, NAD, cooperative  HENT: neck supple, no JVD  Eyes: clear sclera  CV: RRR s1 s2  L: clear  Abd: soft, NT, non distended, obese  Ext: no c/c/e  N: no appreciable acute focal deficits  Psych: appropriate mood and behavior     Last Recorded Vitals  /69   Pulse 67   Temp 36.8 °C (98.2 °F)   Resp 20   Wt 86.2 kg (190 lb)   SpO2 97%     Assessment/Plan     Chest pain, concern for unstable angina, RODRIGUEZ    DM II with peripheral  neuropathy  HTN, HLD  Asthma  Chronic back pain  Depression, RICKY  TAYLOR on CPAP  Morbid obesity  DVT ppx  Full code    Plan:  - Continue ASA, currently CP free after one SL nitroglycerin, add on lipid panel, monitor BP trend, ECHO, consult cardiology, NPO after midnight except meds until seen by cardiology, likely will benefit from expedited ischemic evaluation given numerous risk factors and presentation  - While NPO will cut home AM lantus in half, ISS,       Jimmy Tejeda, DO

## 2025-03-29 NOTE — PROGRESS NOTES
"   25 1139   Discharge Planning   Living Arrangements Spouse/significant other   Support Systems Spouse/significant other;Children   Type of Residence Private residence   Number of Stairs to Enter Residence   (Pt reporting she uses the elevator.)   Number of Stairs Within Residence 0   Do you have animals or pets at home? Yes   Type of Animals or Pets 1 dog   Who is requesting discharge planning? Provider     VANESA met with the pt at bedside in effort to complete this assessment. MSW made introduction and explained role. Pt is a&ox3. Pt verifies her , address and insurance. Pt reporting she is independent in terms of ambulating, ADL's/IADL's. Pt denies any recent falls. Pt discloses she does not drive. She is reliant on her spouse, her dtr, or PAR for transportation to and from medical appointments. Pt endorses a diabetes dx. No reported SW needs. Dispo TBD per the pt's medical course. Pt reporting if discharged home spouse or dtr are able to transport.   PCP Orquidea Chris Last Seen \"2 months ago\". Pt reporting she is seen by her PCP every 3 months.   Pharmacy: [facility] and/or [facility]  "

## 2025-03-29 NOTE — PROGRESS NOTES
.  Aide Bella is a 59 y.o. female on day 0 of admission presenting with Exertional dyspnea.      Assessment / Plan        58 yo F with PMHx DM II with peripheral neuropathy, HTN, HLD, asthma, chronic back pain, depression, RICKY, TAYLOR on CPAP, and obesity presents with progressively worsening non radiating, midsternal chest pressure accompanied with sob     #Chest pain, concerning for unstable angina  #Dyspnea on exertion  Plan:  -Continue ASA  -Lipid panel/A1c results reviewed  -Follow-up echocardiogram  -Follow-up cardiac consult      Chronic Medical conditions  DM II with peripheral neuropathy  HTN, HLD  Asthma  Chronic back pain  Depression, RICKY  TAYLOR on CPAP  Plan:  -While n.p.o. currently on half home Lantus dose    DVT ppx: Holding  IVF: -  Diet: NPO  Consults: Cardio    Care Planning/PT-OT: Not indicated       Chris Matos MD   PGY-II, Internal Medicine  This is a preliminary note, please await attending attestation for final A/P    Subjective     Patient seen and assessed this morning, used CPAP overnight.  There is no endorsing shortness of breath/chest heaviness that she had yesterday at this moment.  Patient understands plan regarding echocardiogram/seeing cardiology    Objective       Physical Exam:  General:  Pleasant and cooperative. No apparent distress.  Overweight  HEENT:  Normocephalic, atraumatic  Chest: Clear lung sounds bilaterally  CV:  Regular rate and rhythm. No murmurs    Abdomen: Abdomen is soft, non-tender, non-distended. BS +   Extremities: Bilateral 1+ edema  Neurological:  AAOx3. No focal deficits.  Skin:  Warm and dry.

## 2025-03-29 NOTE — CARE PLAN
The patient's goals for the shift include sleep    The clinical goals for the shift include maintain comfort and saftey    Problem: Nutrition  Goal: Nutrient intake appropriate for maintaining nutritional needs  Outcome: Progressing     Problem: Chronic Conditions and Co-morbidities  Goal: Patient's chronic conditions and co-morbidity symptoms are monitored and maintained or improved  Outcome: Progressing     Problem: Discharge Planning  Goal: Discharge to home or other facility with appropriate resources  Outcome: Progressing

## 2025-03-30 ENCOUNTER — DOCUMENTATION (OUTPATIENT)
Dept: CARDIOLOGY | Facility: CLINIC | Age: 60
End: 2025-03-30
Payer: MEDICARE

## 2025-03-30 VITALS
RESPIRATION RATE: 18 BRPM | TEMPERATURE: 96.6 F | BODY MASS INDEX: 40.86 KG/M2 | WEIGHT: 230.6 LBS | OXYGEN SATURATION: 94 % | SYSTOLIC BLOOD PRESSURE: 137 MMHG | HEIGHT: 63 IN | HEART RATE: 59 BPM | DIASTOLIC BLOOD PRESSURE: 81 MMHG

## 2025-03-30 LAB
ANION GAP SERPL CALC-SCNC: 15 MMOL/L (ref 10–20)
BUN SERPL-MCNC: 17 MG/DL (ref 6–23)
CALCIUM SERPL-MCNC: 9.2 MG/DL (ref 8.6–10.3)
CHLORIDE SERPL-SCNC: 107 MMOL/L (ref 98–107)
CO2 SERPL-SCNC: 22 MMOL/L (ref 21–32)
CREAT SERPL-MCNC: 0.81 MG/DL (ref 0.5–1.05)
EGFRCR SERPLBLD CKD-EPI 2021: 83 ML/MIN/1.73M*2
ERYTHROCYTE [DISTWIDTH] IN BLOOD BY AUTOMATED COUNT: 13.5 % (ref 11.5–14.5)
GLUCOSE BLD MANUAL STRIP-MCNC: 124 MG/DL (ref 74–99)
GLUCOSE BLD MANUAL STRIP-MCNC: 141 MG/DL (ref 74–99)
GLUCOSE BLD MANUAL STRIP-MCNC: 165 MG/DL (ref 74–99)
GLUCOSE SERPL-MCNC: 144 MG/DL (ref 74–99)
HCT VFR BLD AUTO: 46.3 % (ref 36–46)
HGB BLD-MCNC: 13.9 G/DL (ref 12–16)
MAGNESIUM SERPL-MCNC: 2.19 MG/DL (ref 1.6–2.4)
MCH RBC QN AUTO: 28.1 PG (ref 26–34)
MCHC RBC AUTO-ENTMCNC: 30 G/DL (ref 32–36)
MCV RBC AUTO: 94 FL (ref 80–100)
NRBC BLD-RTO: 0 /100 WBCS (ref 0–0)
PLATELET # BLD AUTO: 157 X10*3/UL (ref 150–450)
POTASSIUM SERPL-SCNC: 4.5 MMOL/L (ref 3.5–5.3)
RBC # BLD AUTO: 4.94 X10*6/UL (ref 4–5.2)
SODIUM SERPL-SCNC: 139 MMOL/L (ref 136–145)
WBC # BLD AUTO: 9.1 X10*3/UL (ref 4.4–11.3)

## 2025-03-30 PROCEDURE — 2500000002 HC RX 250 W HCPCS SELF ADMINISTERED DRUGS (ALT 637 FOR MEDICARE OP, ALT 636 FOR OP/ED): Performed by: STUDENT IN AN ORGANIZED HEALTH CARE EDUCATION/TRAINING PROGRAM

## 2025-03-30 PROCEDURE — 82947 ASSAY GLUCOSE BLOOD QUANT: CPT | Mod: 59

## 2025-03-30 PROCEDURE — 99239 HOSP IP/OBS DSCHRG MGMT >30: CPT

## 2025-03-30 PROCEDURE — 2500000001 HC RX 250 WO HCPCS SELF ADMINISTERED DRUGS (ALT 637 FOR MEDICARE OP): Performed by: STUDENT IN AN ORGANIZED HEALTH CARE EDUCATION/TRAINING PROGRAM

## 2025-03-30 PROCEDURE — 36415 COLL VENOUS BLD VENIPUNCTURE: CPT

## 2025-03-30 PROCEDURE — 80048 BASIC METABOLIC PNL TOTAL CA: CPT

## 2025-03-30 PROCEDURE — 85027 COMPLETE CBC AUTOMATED: CPT

## 2025-03-30 PROCEDURE — G0378 HOSPITAL OBSERVATION PER HR: HCPCS

## 2025-03-30 PROCEDURE — 83735 ASSAY OF MAGNESIUM: CPT

## 2025-03-30 RX ADMIN — OXYCODONE HYDROCHLORIDE AND ACETAMINOPHEN 1 TABLET: 5; 325 TABLET ORAL at 04:33

## 2025-03-30 RX ADMIN — OXYCODONE HYDROCHLORIDE 2.5 MG: 5 TABLET ORAL at 11:16

## 2025-03-30 RX ADMIN — ASPIRIN 81 MG CHEWABLE TABLET 81 MG: 81 TABLET CHEWABLE at 08:46

## 2025-03-30 RX ADMIN — OXYCODONE HYDROCHLORIDE AND ACETAMINOPHEN 1 TABLET: 5; 325 TABLET ORAL at 11:16

## 2025-03-30 RX ADMIN — OXYCODONE HYDROCHLORIDE 2.5 MG: 5 TABLET ORAL at 04:33

## 2025-03-30 RX ADMIN — INSULIN GLARGINE 15 UNITS: 100 INJECTION, SOLUTION SUBCUTANEOUS at 04:33

## 2025-03-30 RX ADMIN — EMPAGLIFLOZIN 10 MG: 10 TABLET, FILM COATED ORAL at 08:46

## 2025-03-30 ASSESSMENT — PAIN - FUNCTIONAL ASSESSMENT: PAIN_FUNCTIONAL_ASSESSMENT: 0-10

## 2025-03-30 ASSESSMENT — COGNITIVE AND FUNCTIONAL STATUS - GENERAL
DAILY ACTIVITIY SCORE: 24
MOBILITY SCORE: 24

## 2025-03-30 ASSESSMENT — PAIN SCALES - GENERAL: PAINLEVEL_OUTOF10: 8

## 2025-03-30 ASSESSMENT — PAIN DESCRIPTION - DESCRIPTORS: DESCRIPTORS: ACHING

## 2025-03-30 NOTE — PROGRESS NOTES
.  Aide Bella is a 59 y.o. female on day 0 of admission presenting with Exertional dyspnea.      Assessment / Plan        58 yo F with PMHx DM II with peripheral neuropathy, HTN, HLD, asthma, chronic back pain, depression, RICKY, TAYLOR on CPAP, and obesity presents with progressively worsening non radiating, midsternal chest pressure accompanied with sob     #Chest pain, concerning for unstable angina  #Dyspnea on exertion  Plan:  -Continue ASA  -Lipid panel/A1c results reviewed  -Follow-up echocardiogram  -Follow-up cardiac consult, messaged on call cardiologists     Chronic Medical conditions  DM II with peripheral neuropathy  HTN, HLD  Asthma  Chronic back pain  Depression, RICKY  TAYLOR on CPAP  Plan:  -Continue Jardiance, ASA  -Insulin: Lantus 15, SSI    DVT ppx: Holding  IVF: -  Diet: NPO  Consults: Cardio    Care Planning/PT-OT: Not indicated       Chris Matos MD   PGY-II, Internal Medicine  This is a preliminary note, please await attending attestation for final A/P    Subjective     Patient seen and assessed this morning, has not had shortness of breath/chest pain since the episode yesterday that brought her into the hospital.  Denying any new swelling, fevers, chills, or any other new/acute symptoms    Objective       Physical Exam:  General:  Pleasant and cooperative. No apparent distress.  Overweight  HEENT:  Normocephalic, atraumatic  Chest: Clear lung sounds bilaterally  CV:  Regular rate and rhythm. No murmurs    Abdomen: Abdomen is soft, non-tender, non-distended. BS +   Extremities: Bilateral 1+ edema  Neurological:  AAOx3. No focal deficits.  Skin:  Warm and dry.

## 2025-03-30 NOTE — DISCHARGE SUMMARY
Discharge Diagnosis  #Chest pain, concerning for unstable angina  #Dyspnea on exertion  DM II with peripheral neuropathy  HTN, HLD  Asthma  Chronic back pain  Depression, RICKY  TAYLOR on CPAP    Issues Requiring Follow-Up  Follow-up with PCP and cardiology    Discharge Meds     Medication List      CONTINUE taking these medications     aspirin 81 mg EC tablet   cyclobenzaprine 10 mg tablet; Commonly known as: Flexeril   glimepiride 4 mg tablet; Commonly known as: Amaryl   Jardiance 10 mg tablet; Generic drug: empagliflozin   Lantus Solostar U-100 Insulin 100 unit/mL (3 mL) pen; Generic drug:   insulin glargine   metFORMIN 500 mg tablet; Commonly known as: Glucophage   NovoLOG Flexpen U-100 Insulin 100 unit/mL (3 mL) pen; Generic drug:   insulin aspart   oxyCODONE-acetaminophen 7.5-325 mg tablet; Commonly known as: [facility]     Hospital Course  60 yo F with PMHx DM II with peripheral neuropathy, HTN, HLD, asthma, chronic back pain, depression, RICKY, TAYLOR on CPAP, and obesity presents with progressively worsening non radiating, midsternal chest pressure accompanied with sob.  While in the hospital patient's symptoms improved and she was back at baseline.  Cardiac workup including EKG, troponin, BNP was unremarkable.  Patient was eager to be discharged and to be seen by cardiology.  Patient was assessed by cardiology whom stated it would be okay for patient to follow-up outpatient for nuclear stress test.  Patient is stable for discharge on 3/30/2025 with no new medication changes and written/verbal instructions to follow-up with outpatient cardiology and her primary care provider.    Pertinent Physical Exam At Time of Discharge  Physical Exam  General:  Pleasant and cooperative. No apparent distress.  Overweight  HEENT:  Normocephalic, atraumatic  Chest: Clear lung sounds bilaterally  CV:  Regular rate and rhythm. No murmurs    Abdomen: Abdomen is soft, non-tender, non-distended. BS +   Extremities: Bilateral 1+  edema  Neurological:  AAOx3. No focal deficits.  Skin:  Warm and dry.      Chris Matos MD

## 2025-03-30 NOTE — DISCHARGE INSTRUCTIONS
Discharge instructions:  -Continue your home medications with no changes  -You will need to follow-up with a cardiologist and your primary care provider  -You will require a test outside the hospital to be done called a nuclear stress test to better assess your heart function, it is incredibly important to follow-up with the cardiologist and your primary care provider listed above to do so

## 2025-03-30 NOTE — CARE PLAN
The patient's goals for the shift include sleep    The clinical goals for the shift include maintain safety      Problem: Nutrition  Goal: Nutrient intake appropriate for maintaining nutritional needs  Outcome: Progressing     Problem: Chronic Conditions and Co-morbidities  Goal: Patient's chronic conditions and co-morbidity symptoms are monitored and maintained or improved  Outcome: Progressing     Problem: Safety - Adult  Goal: Free from fall injury  Outcome: Progressing     Problem: Discharge Planning  Goal: Discharge to home or other facility with appropriate resources  Outcome: Progressing

## 2025-04-03 LAB
ATRIAL RATE: 63 BPM
P AXIS: 51 DEGREES
P OFFSET: 174 MS
P ONSET: 133 MS
PR INTERVAL: 170 MS
Q ONSET: 218 MS
QRS COUNT: 10 BEATS
QRS DURATION: 82 MS
QT INTERVAL: 398 MS
QTC CALCULATION(BAZETT): 407 MS
QTC FREDERICIA: 404 MS
R AXIS: -8 DEGREES
T AXIS: 19 DEGREES
T OFFSET: 417 MS
VENTRICULAR RATE: 63 BPM

## 2025-04-11 PROBLEM — G47.33 OBSTRUCTIVE SLEEP APNEA SYNDROME: Status: ACTIVE | Noted: 2025-04-11

## 2025-04-11 PROBLEM — M47.816 LUMBAR SPONDYLOSIS: Status: ACTIVE | Noted: 2025-04-11

## 2025-04-11 PROBLEM — F41.8 DEPRESSION WITH ANXIETY: Status: ACTIVE | Noted: 2025-04-11

## 2025-04-11 PROBLEM — I10 BENIGN ESSENTIAL HYPERTENSION: Status: ACTIVE | Noted: 2025-04-11

## 2025-04-11 PROBLEM — E11.65 TYPE 2 DIABETES MELLITUS WITH HYPERGLYCEMIA (MULTI): Status: ACTIVE | Noted: 2025-04-11

## 2025-04-11 PROBLEM — M25.562 LEFT KNEE PAIN: Status: ACTIVE | Noted: 2025-04-11

## 2025-04-11 PROBLEM — M54.32 BACK PAIN WITH LEFT-SIDED SCIATICA: Status: ACTIVE | Noted: 2025-04-11

## 2025-04-11 PROBLEM — J44.9 CHRONIC OBSTRUCTIVE PULMONARY DISEASE (MULTI): Status: ACTIVE | Noted: 2020-08-13

## 2025-04-11 PROBLEM — G47.10 DAYTIME HYPERSOMNIA: Status: ACTIVE | Noted: 2025-04-11

## 2025-04-11 PROBLEM — J45.20 MILD INTERMITTENT ASTHMA: Status: ACTIVE | Noted: 2021-09-03

## 2025-04-11 PROBLEM — M48.061 FORAMINAL STENOSIS OF LUMBAR REGION: Status: ACTIVE | Noted: 2024-05-20

## 2025-04-11 PROBLEM — E66.01 MORBID OBESITY (MULTI): Status: ACTIVE | Noted: 2025-04-11

## 2025-04-11 PROBLEM — E78.2 MIXED HYPERLIPIDEMIA: Status: ACTIVE | Noted: 2018-08-20

## 2025-04-11 PROBLEM — G62.9 PERIPHERAL NEUROPATHY: Status: ACTIVE | Noted: 2025-04-11

## 2025-04-11 PROBLEM — Z98.84 HISTORY OF BARIATRIC SURGERY: Status: ACTIVE | Noted: 2025-04-11

## 2025-04-14 ENCOUNTER — APPOINTMENT (OUTPATIENT)
Dept: CARDIOLOGY | Facility: CLINIC | Age: 60
End: 2025-04-14
Payer: MEDICARE